# Patient Record
(demographics unavailable — no encounter records)

---

## 2024-11-08 NOTE — PHYSICAL EXAM
[Full Body Skin Exam Performed] : performed [FreeTextEntry3] : Fairly uniform and regular brown macules and papules on the trunk and extremities

## 2024-11-08 NOTE — ASSESSMENT
[FreeTextEntry1] : 1) Nevi - Counseled about clinically benign lesions - Discussed regular OTC sunscreen use, SPF 30 or higher  2) Lentigines - Reviewed risks (as well as mitigation strategies for adverse drug events as applicable), benefits, and alternatives of therapy  - discussed cosmetic treatments including cryotherapy, hydroquinone, and laser treatment. discussed low overall efficacy of treatments and high recurrent rates - Restart hydroquinone 4% cream once daily for 3 months on and 1 month off  3) Skin cancer Screening - No lesions clinically concerning for malignancy today - Discussed regular self skin checks and ABCDEs of skin cancer screening - Discussed regular sunscreen use - Pt instructed to report any new or changing lesions  RTC 1 yr for FBSE or sooner if any concerns

## 2024-11-08 NOTE — HISTORY OF PRESENT ILLNESS
[FreeTextEntry1] : f/u moles [de-identified] : 70 y/o F presenting for f/u for moles. Interested in refills for hydroquinone for dark spots. Has not been using it recently.

## 2024-12-02 NOTE — HEALTH RISK ASSESSMENT
[Very Good] : ~his/her~  mood as very good [No] : No [Never (0 pts)] : Never (0 points) [No falls in past year] : Patient reported no falls in the past year [0] : 1) Little interest or pleasure doing things: Not at all (0) [PHQ-2 Negative - No further assessment needed] : PHQ-2 Negative - No further assessment needed [Never] : Never [NO] : No [Patient reported mammogram was normal] : Patient reported mammogram was normal [None] : None [With Family] : lives with family [Retired] : retired [College] : College [] :  [Feels Safe at Home] : Feels safe at home [Fully functional (bathing, dressing, toileting, transferring, walking, feeding)] : Fully functional (bathing, dressing, toileting, transferring, walking, feeding) [Fully functional (using the telephone, shopping, preparing meals, housekeeping, doing laundry, using] : Fully functional and needs no help or supervision to perform IADLs (using the telephone, shopping, preparing meals, housekeeping, doing laundry, using transportation, managing medications and managing finances) [Smoke Detector] : smoke detector [Carbon Monoxide Detector] : carbon monoxide detector [Seat Belt] :  uses seat belt [[id number]] : 0 [Change in mental status noted] : No change in mental status noted [Language] : denies difficulty with language [Behavior] : denies difficulty with behavior [Learning/Retaining New Information] : denies difficulty learning/retaining new information [Handling Complex Tasks] : denies difficulty handling complex tasks [Reasoning] : denies difficulty with reasoning [Spatial Ability and Orientation] : denies difficulty with spatial ability and orientation [Reports changes in hearing] : Reports no changes in hearing [Reports changes in vision] : Reports no changes in vision [Reports changes in dental health] : Reports no changes in dental health [MammogramDate] : 5/24

## 2024-12-02 NOTE — PLAN
[FreeTextEntry1] : Annual had flu shot mammo and gyn and colon utd  bp well controlled weight an issue will start Wegovy and titrate up moving to [place] June 2025 full labs take vit d EKG non sp[ecific changes  poor quality

## 2024-12-02 NOTE — HISTORY OF PRESENT ILLNESS
[de-identified] : Annual had flu shot colon 2022 jana may 2024  on statin every third day see psych sleep an issue weight an issue

## 2025-01-09 NOTE — HISTORY OF PRESENT ILLNESS
[de-identified] : prior evaluation of thyroid nodule. cytologically benign. denies dysphagia, hoarseness or new lesions. no changes medically since last visit. recent sonogram stable.  I have reviewed all old and new data and available images.  recent calcium borderline

## 2025-01-09 NOTE — PHYSICAL EXAM
[de-identified] : 1.2 cm left thyroid nodule, well circumscribed and mobile [Laryngoscopy Performed] : laryngoscopy was performed, see procedure section for findings [Midline] : located in midline position [Normal] : orientation to person, place, and time: normal

## 2025-01-09 NOTE — ASSESSMENT
[FreeTextEntry1] : patient moving to [place] shortly - will f/u with physician there. bloods drawn. to call next week for results.  patient has been given the opportunity to ask questions, and all of the patient's questions have been answered to their satisfaction

## 2025-04-09 NOTE — CURRENT MEDS
[Takes medication as prescribed] : takes [None] : Patient does not have any barriers to medication adherence [Yes] : Reviewed medication list for presence of high-risk medications. [Benzodiazepines] : benzodiazepines [Other____] : [unfilled]

## 2025-04-09 NOTE — PLAN
[FreeTextEntry1] : bp good anxiety discussed weight discussed increase wegovy to 2.4 check lipid, lft and cbc abnd thyroid last vit d very good f/u mammo
Warm

## 2025-04-09 NOTE — HISTORY OF PRESENT ILLNESS
[FreeTextEntry1] : f/u [de-identified] : moving to [place] high bp on med osteopenia n vit d and calcium see psych on citalopram 20 on melatonin 10 anxious about move and everything else on compound wegobvy 1.7 losing weight slowly